# Patient Record
(demographics unavailable — no encounter records)

---

## 2024-12-03 NOTE — PROCEDURE
[Transvaginal OB Sonogram] : Transvaginal OB Sonogram [Transabdominal OB Sonogram] : Transabdominal OB Sonogram [Intrauterine Pregnancy] : intrauterine pregnancy [Yolk Sac] : no yolk sac [Transvaginal OB Sonogram WNL] : Transvaginal OB Sonogram - abnormalities noted [FreeTextEntry1] : No sac seen an abdominal ultrasound. 6w1d by LMP, likely gestational sac seen on TVUS. no yolk sac. probably IUP but not definitive.

## 2024-12-03 NOTE — HISTORY OF PRESENT ILLNESS
[FreeTextEntry1] : 23 yo  at 6w0d by LMP 10/21/24 presenting for medication . Presents today with her partner. Both are tearful. Karina maintains that she is certain she would like to proceed with .  ALL: ceclor  MEDS: levothyrxoine, metforming, Glimeperide, Lexapro, antibiotic for recent UTI PMH: anxiety, T2DM PSH: thyroidectomy, D+C for uterine polyp, wisdom teeth removal OBHx: G1 GYNHx: +hx of polyps, denies fibroids, cysts, abnormal PAPs Patient declines discussion of birth control at this appointment    Medication    Patient denies medical history of: Liver disease, Renal failure, Chronic adrenal failure, Long term systemic corticosteroid use, IUD in place, Anticoagulant use of hemorrhagic disorder, Inherited porphyrias, Anemia, Allergy to mifepristone, misoprostol or other prostaglandins     Options for the pregnancy were discussed with the patient, including continuation of pregnancy, medical , dilation and curettage (D&C) in the office under local anesthesia or in the operating room under sedation. They do not desire to continue the pregnancy and are requesting medication . They understand that 10% of people who take medication  for pul will have a continuing iup. They understand that medication  is not reversible. Common side effects and additional risks below reviewed.   Common side effects: cramping, bleeding, low grade fever, diarrhea, nausea, vomiting, headache, dizziness, back pain, feeling tired   The risks of medication  including: - Continuing pregnancy, pregnancy tissue or blood clots in the uterus and the need for further medication or surgery - Incomplete  causing heavy bleeding, infection, or both (which may require other testing or treatments such as further medication or surgery) - Bleeding too much or too long which may require further treatment with medication or surgery, or a blood transfusion - Infection in the uterus, which may require further treatment with medication, surgery or antibiotics.  It may also require hospital admission - Allergic reaction to any or all of the medications used    [facility] patient agreement reviewed and signed. A copy was given to the patient, along with the user guide.   1. Patient agrees to undergo surgical  if medication  fails.  2. The patient states they have access to a phone and a nearby hospital if the need for emergency services arises.  3. The patient states they have someone to be available to them if needed at time of medication . 4. The patient is willing and able to follow up to confirm the pregnancy was successfully terminated.   The patient was thoroughly counseled on instructions for medical  and the warning signs of any problems.  They voiced understanding of these warning signs and when to call and were provided with 24-hour contact information for on-call and available physicians. The patient also understands it is their responsibility to bring to the attention of their physician any unusual symptoms following the procedure and to report to follow-up phone calls and/or examinations.   They understand the need to call the office if they have no bleeding in 24 hours after misoprostol as this could mean either the medical  did not work, or something such as an ectopic pregnancy occurred.   The patient is sure of their decision and denies any coercion from family, friends or healthcare providers. The patient had the opportunity to ask questions and all questions were answered.

## 2024-12-03 NOTE — PLAN
[FreeTextEntry1] : 31 yo mife/miso likely iup I will call with HCG results. Declines contraception at this time, will revisit at follow up. ECTOPIC PRECUATIONS GIVEN  Day 1: Mifepristone 200 mg administered orally (see order for details)   Serum hCG must be sent on the day of mifepristone (day 1). Management based on initial serum hCG level (results are obtained after mifepristone has already been administered and patient has left clinic).   hCG of less than 2,000, the  can proceed as planned.   hCG between 2,000 and 2,999, - WHEN PUL, THIS WOULD NEED FURTHER IMAGING, GIVEN LIKELY IUP AND GS SEEN, CAN PROCEED WTIH  AS PLANNED   hCG of more than 3,000 or if diagnostic ultrasonogram does not confirm IUP, the patient must be referred to an ED for ectopic pregnancy evaluation. Day 23: Misoprostol 800 micrograms self-administered buccally Day 45: Serum hCG testing (4872 h after misoprostol)   Serum hCG decline required to diagnose complete .   50% decline 4872 h after misoprostol (day 45 after mifepristone).   80% decline by 1 week after mifepristone.   I spent a total of 30 minutes on the encounter evaluating and treating the patient.  Total time does not include the time spent on separately billable procedures performed on this DOS.

## 2024-12-03 NOTE — HISTORY OF PRESENT ILLNESS
[FreeTextEntry1] : 25 yo  at 6w0d by LMP 10/21/24 presenting for medication . Presents today with her partner. Both are tearful. Karina maintains that she is certain she would like to proceed with .  ALL: ceclor  MEDS: levothyrxoine, metforming, Glimeperide, Lexapro, antibiotic for recent UTI PMH: anxiety, T2DM PSH: thyroidectomy, D+C for uterine polyp, wisdom teeth removal OBHx: G1 GYNHx: +hx of polyps, denies fibroids, cysts, abnormal PAPs Patient declines discussion of birth control at this appointment    Medication    Patient denies medical history of: Liver disease, Renal failure, Chronic adrenal failure, Long term systemic corticosteroid use, IUD in place, Anticoagulant use of hemorrhagic disorder, Inherited porphyrias, Anemia, Allergy to mifepristone, misoprostol or other prostaglandins     Options for the pregnancy were discussed with the patient, including continuation of pregnancy, medical , dilation and curettage (D&C) in the office under local anesthesia or in the operating room under sedation. They do not desire to continue the pregnancy and are requesting medication . They understand that 10% of people who take medication  for pul will have a continuing iup. They understand that medication  is not reversible. Common side effects and additional risks below reviewed.   Common side effects: cramping, bleeding, low grade fever, diarrhea, nausea, vomiting, headache, dizziness, back pain, feeling tired   The risks of medication  including: - Continuing pregnancy, pregnancy tissue or blood clots in the uterus and the need for further medication or surgery - Incomplete  causing heavy bleeding, infection, or both (which may require other testing or treatments such as further medication or surgery) - Bleeding too much or too long which may require further treatment with medication or surgery, or a blood transfusion - Infection in the uterus, which may require further treatment with medication, surgery or antibiotics.  It may also require hospital admission - Allergic reaction to any or all of the medications used    [facility] patient agreement reviewed and signed. A copy was given to the patient, along with the user guide.   1. Patient agrees to undergo surgical  if medication  fails.  2. The patient states they have access to a phone and a nearby hospital if the need for emergency services arises.  3. The patient states they have someone to be available to them if needed at time of medication . 4. The patient is willing and able to follow up to confirm the pregnancy was successfully terminated.   The patient was thoroughly counseled on instructions for medical  and the warning signs of any problems.  They voiced understanding of these warning signs and when to call and were provided with 24-hour contact information for on-call and available physicians. The patient also understands it is their responsibility to bring to the attention of their physician any unusual symptoms following the procedure and to report to follow-up phone calls and/or examinations.   They understand the need to call the office if they have no bleeding in 24 hours after misoprostol as this could mean either the medical  did not work, or something such as an ectopic pregnancy occurred.   The patient is sure of their decision and denies any coercion from family, friends or healthcare providers. The patient had the opportunity to ask questions and all questions were answered.

## 2024-12-03 NOTE — PHYSICAL EXAM
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

## 2025-01-17 NOTE — HISTORY OF PRESENT ILLNESS
[FreeTextEntry1] : 32 year old female with PMHx: medical induced , type 2 DM, hypothyroidism, obesity who presents for cardiovascular concerns.   requested by endocrinologist to discuss with cardiologist regarding hyperlipidemia elevated cholesterol unclear values works as  active after work, actively pursueing weight loss hx of goiter with nodules s/p removal of total thyroid now on replacement type 2 DM markedly improved control with hba1c now 6.7% no prior cardiac history family history of cardiovascular disease: father second hand smoke due to boyfriend, he is actively trying to quit Denies chest pain/pressure, palpitations, irregular and/or rapid heart beat, SOB, PEREZ, syncope/near syncope, dizziness, orthopnea, PND, cough, edema, f/c, n/v/d, hematuria, or hematochezia. Denies claudication, PAD, venous changes, TIA/CVA history, dizziness, amaroux fugax, vision/sensory changes.

## 2025-01-17 NOTE — DISCUSSION/SUMMARY
[FreeTextEntry1] : Patient presents for: +hyperlipidemia obesity type 2 DM hypothyroidism family history of ASCVD   Orders placed including imaging/meds:  - 2d TTE - Exercise treadmill stress test - CAC score - lipid panel, lp(a), apo B testing to evaluate for ASCVD + initiation of Rx.  I have instructed the patient to follow a 1200 calories meal plan emphasizing low saturated fat sources and protein with low amount of sodium.  Information on avoiding fast food, fried foods, food with high fat content was suggested, plant based low fat, high fiber diet. We reviewed the efforts of weight reduction identifying 3500 calories in pound of body fat and the need to gradually achieve a long term basis for weight reduction discussed the need to reduce calories for what her current patterns are and to hopefully increase physical activity as well. We discussed menu selection as well as food preparation techniques. Educated patient on 150 minutes of moderate intensity exercise weekly with strength training twice weekly. Encouraged patient to monitor physical activity Discussed the importance of a balanced, healthy diet of nourishing foods and consistent meal pattern for long-term success and health maintenance. Encouraged to increase water intake to facilitate adequate hydration and to cut out sugary drinks and alcohol. Pt educated on eating 4-6 small meals per day, that are high in protein for hunger regulation. Pt educated on cutting out high-sugar content foods sabotaging wt loss. Pt verbalized understanding. we discussed obesity implications with general health and cardiovascular process. I discussed in detail issues that can potentially arise with the cardiovascular and general health system. The patient is actively in the gym trying to lose weight. consideration of bariatric surgery, weight loss medication and nutritional referral offered.   Patient warm and euvolemic. There is no evidence of active ACS, decompensated HF, uncontrolled arrhythmias or significant valvular heart disease at present. EKG is non-ischemic. We went over the EKG in office today, all questions answered. Vitals reviewed.    I've asked the patient to keep a blood pressure journal and report back if SBP >130 or DBP >90 on 2-3 separate random occasions. The patient is aware of alarm cardiac type symptoms, will call with questions or concerns and is aware to activate 911 and/or present to the closest emergency department if concerns.   Follow up: with me in 3-4 months or sooner as requested.     I strongly encouraged the patient to pursue the following preventative cardiology, lifestyle modifications which are necessary for long-term cardiovascular health. The following is recommended: Advised a mediterranean and/or plant predominant, high fiber, limited salt (ideal <2 g/day), low fat diet, stress reduction/psychosomatic management, sleep hygiene/MICHELLE testing and healthy weight loss, annual influenza/preventive vaccines, medication + treatment adherence/compliance, high risk behavior mitigation (I.e. tobacco abstinence, alcohol abstinence, no binge drinking, recreational/illicit drug use abstinence), increased exercise activity aiming for 150 minutes per week of moderate physical activity as per AHA/ACC standard for long term cardiovascular risk reduction. [EKG obtained to assist in diagnosis and management of assessed problem(s)] : EKG obtained to assist in diagnosis and management of assessed problem(s)

## 2025-01-17 NOTE — REASON FOR VISIT
[FreeTextEntry1] : 32 year old female with PMHx: medical induced , type 2 DM, hypothyroidism, obesity who presents for cardiovascular concerns.